# Patient Record
Sex: FEMALE | Race: BLACK OR AFRICAN AMERICAN
[De-identification: names, ages, dates, MRNs, and addresses within clinical notes are randomized per-mention and may not be internally consistent; named-entity substitution may affect disease eponyms.]

---

## 2014-08-05 VITALS
SYSTOLIC BLOOD PRESSURE: 154 MMHG | DIASTOLIC BLOOD PRESSURE: 76 MMHG | SYSTOLIC BLOOD PRESSURE: 154 MMHG | SYSTOLIC BLOOD PRESSURE: 154 MMHG | DIASTOLIC BLOOD PRESSURE: 76 MMHG | DIASTOLIC BLOOD PRESSURE: 76 MMHG | SYSTOLIC BLOOD PRESSURE: 154 MMHG | DIASTOLIC BLOOD PRESSURE: 76 MMHG

## 2020-09-25 ENCOUNTER — HOSPITAL ENCOUNTER (OUTPATIENT)
Dept: HOSPITAL 61 - KCIC MAMMO | Age: 85
Discharge: HOME | End: 2020-09-25
Attending: INTERNAL MEDICINE
Payer: MEDICARE

## 2020-09-25 DIAGNOSIS — Z12.31: Primary | ICD-10-CM

## 2020-09-25 PROCEDURE — 77067 SCR MAMMO BI INCL CAD: CPT

## 2020-09-25 PROCEDURE — 77063 BREAST TOMOSYNTHESIS BI: CPT

## 2020-09-25 NOTE — [facility]
Bilateral digital screening mammograms with 3-D tomosynthesis:

 

Reason for examination: Routine screening.

 

Comparison is made to previous studies dated 1/8/2015 and 8/2/2012.

 

Bilateral mammograms in CC and oblique projections were obtained with 2-D 

imaging and 3-D tomosynthesis imaging on a Siemens Inspiration unit and 

reviewed on the workstation. Interpretation was made with the benefit of 

CAD.

 

The skin and nipples show no abnormalities. No abnormal axillary lymph 

nodes are seen. The breast parenchyma shows scattered fatty and 

fibroglandular density. (Breast density: Category B.) There appears to be 

clustered microcalcifications in the 3:00 position posteriorly in the left

breast which may be associated with a small nodular density. Further 

evaluation with coned magnification views and left breast ultrasound is 

recommended. There are no other dominant masses, suspicious calcifications

or architectural distortion. Scattered benign-appearing calcifications are

present.

 

Impression:

 

Clustered calcifications at the 3:00 position posteriorly in the left 

breast which may be associated with a small nodule. Recommend further 

evaluation with coned magnification views and left breast ultrasound.

 

BI-RADS Category 0: Incomplete. Needs additional imaging evaluation.

 

"Our facility is accredited by the [employer] 

Mammography Program."

 

This patient's information has been entered into a reminder system for the

patient to be notified with the results of her examination and a target 

date for the next mammogram.

 

Electronically signed by: Clarisa Kidd MD (9/25/2020 2:27 PM) [id number]

## 2020-11-24 ENCOUNTER — HOSPITAL ENCOUNTER (OUTPATIENT)
Dept: HOSPITAL 61 - KCIC MAMMO | Age: 85
End: 2020-11-24
Attending: INTERNAL MEDICINE
Payer: MEDICARE

## 2020-11-24 DIAGNOSIS — N60.02: Primary | ICD-10-CM

## 2020-11-24 PROCEDURE — 77065 DX MAMMO INCL CAD UNI: CPT

## 2020-11-24 PROCEDURE — 76641 ULTRASOUND BREAST COMPLETE: CPT

## 2020-11-24 NOTE — [facility]
Left digital diagnostic mammogram and left breast ultrasound

 

Reason for examination: Workup of left outer breast nodule and 

microcalcifications

 

Comparison is made to previous study dated mammogram September 25, 2020 

and priors

 

Left CC and ML magnification digital views obtained. Interpretation was 

made with the benefit of CAD.

 

Left mammogram: The skin and nipples show no abnormalities. No abnormal 

lymph nodes are seen. The breast parenchyma is scattered fibroglandular 

elements. (Breast density: Category B.) at the left outer breast 3:00 

position posterior depth there is a subcentimeter moderately dense mass 

with indistinct margins associated with several rounded punctate clustered

microcalcifications with slight pleomorphism, the calcifications are new 

and the density and mass margins have become more distinct from prior 

studies.

 

ULTRASOUND: At the left breast 2:00 position 7 cm from the nipple there is

a 0.7 cm hypoechoic nonshadowing microlobular nonshadowing lesion which 

may be a cluster of cysts corresponding to the region of the mass and 

calcifications on mammography. While the sonographic features are not 

highly suspicious, the mammographic features are of greater suspicion and 

biopsy is advised. No axillary adenopathy.

 

Impression: Left outer breast suspicious microcalcifications associated 

with a hypoechoic complex cystic or solid mass. Left breast 

ultrasound-guided core needle biopsy of the mass and calcifications is 

advised. If after biopsy the sonographic and mammographic lesions are 

discovered to be discordant, or if no significant calcifications are 

yielded from the ultrasound-guided biopsy, subsequent stereotactic 

mammographic guided needle biopsy would be a consideration. Findings were 

discussed with the patient at time of exam. These findings were called to 

Dr. Vail's medical assistant Pelon at 10:50 AM November 24, 2020.

 

BI-RADS Category 4: Suspicious.

 

"Our facility is accredited by the [employer] 

Mammography Program."

 

This patient's information has been entered into a reminder system for the

patient to be notified with the results of her examination and a target 

date for the next mammogram.

 

Electronically signed by: Nitin James MD (11/24/2020 10:52 AM) 

[id number]

## 2020-12-18 ENCOUNTER — HOSPITAL ENCOUNTER (OUTPATIENT)
Dept: HOSPITAL 61 - US | Age: 85
Discharge: HOME | End: 2020-12-18
Attending: INTERNAL MEDICINE
Payer: MEDICARE

## 2020-12-18 DIAGNOSIS — R92.0: ICD-10-CM

## 2020-12-18 DIAGNOSIS — R92.8: Primary | ICD-10-CM

## 2020-12-18 DIAGNOSIS — Z88.0: ICD-10-CM

## 2020-12-18 DIAGNOSIS — Z88.8: ICD-10-CM

## 2020-12-18 DIAGNOSIS — N63.21: ICD-10-CM

## 2020-12-18 DIAGNOSIS — D05.12: ICD-10-CM

## 2020-12-18 DIAGNOSIS — Z88.1: ICD-10-CM

## 2020-12-18 PROCEDURE — 19081 BX BREAST 1ST LESION STRTCTC: CPT

## 2020-12-18 PROCEDURE — C1713 ANCHOR/SCREW BN/BN,TIS/BN: HCPCS

## 2020-12-18 PROCEDURE — 76942 ECHO GUIDE FOR BIOPSY: CPT

## 2020-12-18 PROCEDURE — 19083 BX BREAST 1ST LESION US IMAG: CPT

## 2020-12-18 PROCEDURE — 77065 DX MAMMO INCL CAD UNI: CPT

## 2020-12-18 NOTE — RAD
Examination: MG DIAGNOSTICUNILAT MAMMO, US GDE NDL BX/ASPIR/INJ/LOC



History: Reason: ABNORMAL LEFT MAMMOGRAM/POST BIOPSY CLIP PLACEMENT / Spl. Instructions:  / History: 




Comparison/Correlation: None



Findings: Risks, benefits, and alternatives regarding ultrasound-guided left breast biopsy of the 2:0
0 mass 7 cm from the nipple were discussed with the patient and informed consent was obtained. Cleans
ing with ChloraPrep at the anticipated site of needle placement was performed. Sterile draping, steri
le gel, and sterile probe cover is were utilized. Approximately 8 cc of 1 percent lidocaine was admin
istered subcutaneously and along the expected course of the needle tracks.



Lateral approach was utilized. Scalpel incision was made. Introducer for a 12-gauge needle was placed
. A total of 5 passes were made with a 12-gauge needle via the introducer. Samples were placed in a f
ormalin jar. Biopsy clip marker was then placed via the introducer.



Mediolateral and CC digital images of the left breast were obtained. Scattered fibroglandular densiti
es noted. Biopsy clip marker involving left upper-outer breast. Previously seen calcifications are si
gnificantly less in number. A biopsy clip marker may be approximately 0.5 center anterior to the site
 of the recently seen calcifications. No hematoma.



The patient tolerated the procedure well without immediate complications.





Impression:

Successful ultrasound guided core biopsy of the left breast 2:00 mass. Specimens sent to lab.



Electronically signed by: Bharat Rey MD (12/18/2020 3:35 PM) [email or web address]

## 2020-12-24 NOTE — PATHOLOGY
[facility] Accession Number: [id number]

.                                                                01

Material submitted:                                        .

breast - LEFT BREAST TISSUE, 2:00, 7CMFN. Modifiers: left, 2:00

.                                                                01

Clinical history:                                          .

LEFT BREAST MASS

.                                                                02

**********************************************************************

Diagnosis:

"Left breast tissue 2:00, 7 cmfn", needle biopsy:

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

MICROCALCIFICATIONS PRESENT.  (SEE COMMENT).

(CLW:neville; 12/22/2020)

[facility]  12/24/2020  2222 Local

**********************************************************************

.                                                                02

Comment:

Properly controlled immunohistochemical stains are performed.

.

Blocks A1 and A2:

CK5/6 - loss of staining in the ADH;

Smooth muscle myosin - intact myoepithelial cells;

P63 - intact myoepithelial cells.

.

The case is co-reviewed with Dr. Radha Frye and Dr. Melissa Valles.

Clinical and radiographic correlation is required.  The case will be

discussed with Dr. Khloe Vail and/or his office on 12/28/20.

.

(CLW:[place]; 12/22/2020)

.                                                                02

Electronically signed:                                     .

Val Jung MD, Pathologist

NPI- [id number]

.                                                                01

Gross description:                                         .

The specimen is received in formalin, labeled "Carine Peralta, left breast

2:00 7 cm from nipple".  Received are multiple needle cores of fibrofatty

tissue measuring 1.4 x 1.2 x 0.2 cm in aggregate dimensions.  The specimen

is submitted entirely in cassettes A1 through A3.  The cold ischemic time

is 1 minute.  The total formalin fixation time is 11 hours and 15 minutes.

([facility]; 12/18/2020)

[facility]  12/18/2020  1654 Local

.                                                                02

Pathologist provided ICD-10:

N60.82

.                                                                02

CPT                                                        .

325763, U07993, N06901

Specimen Comment: A courtesy copy of this report has been sent to [phone], [phone]-

Specimen Comment: [id number]

Specimen Comment: Report sent to  / DR VAIL

***Performed at:  01

   [facility]

   [address], [place]  [id number]

   MD Baldemar Tompkins MD Phone:  [phone]

***Performed at:  02

   [facility]

   [address], [place]  [id number]

   MD Jame Michele MD Phone:  [phone]

## 2021-02-11 ENCOUNTER — HOSPITAL ENCOUNTER (OUTPATIENT)
Dept: HOSPITAL 61 - LAB | Age: 86
End: 2021-02-11
Attending: SPECIALIST
Payer: MEDICARE

## 2021-02-11 DIAGNOSIS — Z20.822: ICD-10-CM

## 2021-02-11 DIAGNOSIS — Z01.812: Primary | ICD-10-CM

## 2021-02-11 PROCEDURE — U0003 INFECTIOUS AGENT DETECTION BY NUCLEIC ACID (DNA OR RNA); SEVERE ACUTE RESPIRATORY SYNDROME CORONAVIRUS 2 (SARS-COV-2) (CORONAVIRUS DISEASE [COVID-19]), AMPLIFIED PROBE TECHNIQUE, MAKING USE OF HIGH THROUGHPUT TECHNOLOGIES AS DESCRIBED BY CMS-2020-01-R: HCPCS

## 2021-02-23 NOTE — HP
ADMIT DATE:  



HISTORY OF PRESENT ILLNESS:  The patient was doing well without any known

disease until she had a routine mammogram, which showed a suspicious area of the

left breast.  This was biopsied and showed atypia with possible beginning of

carcinoma of the breast.  She therefore was sent to me.  The patient had no

other symptoms and is doing otherwise well.



PAST MEDICAL HISTORY:  Shows normal childhood diseases.



ALLERGIES:  She has no allergies.



MEDICATIONS:  She takes medication for hypertension only and also takes medicine

for diabetes, which was by mouth.  She does not take insulin and does take

medicine for elevated cholesterol.



PAST SURGICAL HISTORY:  Included an ectopic pregnancy and a hysterectomy. 

Otherwise, no other surgery.



REVIEW OF SYSTEMS:  Negative.  She had a needle biopsy of the left breast, which

showed the pathology noted above.



FAMILY HISTORY:  Positive for diabetes and hypertension.



PHYSICAL EXAMINATION:

GENERAL:  Shows an alert female.  She was in no acute distress.

HEAD, EYES, NOSE AND THROAT:  Grossly normal.

CHEST:  Bilaterally was clear to auscultation.

HEART:  Had no murmurs and no friction rubs.  She did have a rate of 70 beats

per minute and it was regular.

ABDOMEN AND EXTREMITIES:  Grossly negative as were the extremities.

BREASTS:  Examination of the right and left breast in the right and left axilla

were negative.  There were no masses, no skin retraction and there was no

evidence of carcinoma as there was no dimpling either.



IMPRESSION:  Hypertension, hypercholesterolemia, diabetes and left breast mass. 

Plan to remove the left breast mass and proceed.  It is noted that she did not

want to have anything done, but the mass removed.  We did discuss sentinel node

biopsies and other treatments, but she did not want that.  She only wanted at

this point a biopsy of the concerned area of the left breast with complete

removal of the mass if possible  We will do that and proceed accordingly

depending on the pathology.

 



______________________________

IVÁN RODRIGUEZ MD



DR:  ZINA/yenny  JOB#:  [id number] / [id number]

DD:  02/23/2021 22:14  DT:  02/23/2021 22:31

## 2021-02-24 ENCOUNTER — HOSPITAL ENCOUNTER (OUTPATIENT)
Dept: HOSPITAL 61 - US | Age: 86
Discharge: HOME | End: 2021-02-24
Attending: SPECIALIST
Payer: MEDICARE

## 2021-02-24 VITALS
DIASTOLIC BLOOD PRESSURE: 72 MMHG | DIASTOLIC BLOOD PRESSURE: 72 MMHG | SYSTOLIC BLOOD PRESSURE: 162 MMHG | SYSTOLIC BLOOD PRESSURE: 162 MMHG

## 2021-02-24 DIAGNOSIS — J45.909: ICD-10-CM

## 2021-02-24 DIAGNOSIS — Z90.710: ICD-10-CM

## 2021-02-24 DIAGNOSIS — M19.90: ICD-10-CM

## 2021-02-24 DIAGNOSIS — E78.00: ICD-10-CM

## 2021-02-24 DIAGNOSIS — Z98.890: ICD-10-CM

## 2021-02-24 DIAGNOSIS — Z79.84: ICD-10-CM

## 2021-02-24 DIAGNOSIS — N60.12: ICD-10-CM

## 2021-02-24 DIAGNOSIS — D05.12: Primary | ICD-10-CM

## 2021-02-24 DIAGNOSIS — I10: ICD-10-CM

## 2021-02-24 DIAGNOSIS — E11.9: ICD-10-CM

## 2021-02-24 DIAGNOSIS — Z88.0: ICD-10-CM

## 2021-02-24 DIAGNOSIS — Z88.8: ICD-10-CM

## 2021-02-24 DIAGNOSIS — N60.92: ICD-10-CM

## 2021-02-24 DIAGNOSIS — R92.8: ICD-10-CM

## 2021-02-24 LAB
ALBUMIN SERPL-MCNC: 3.7 G/DL (ref 3.4–5)
ALBUMIN/GLOB SERPL: 0.9 {RATIO} (ref 1–1.7)
ALP SERPL-CCNC: 57 U/L (ref 46–116)
ALT SERPL-CCNC: 22 U/L (ref 14–59)
ANION GAP SERPL CALC-SCNC: 6 MMOL/L (ref 6–14)
AST SERPL-CCNC: 16 U/L (ref 15–37)
BASOPHILS # BLD AUTO: 0 X10^3/UL (ref 0–0.2)
BASOPHILS NFR BLD: 0 % (ref 0–3)
BILIRUB SERPL-MCNC: 0.4 MG/DL (ref 0.2–1)
BUN SERPL-MCNC: 13 MG/DL (ref 7–20)
BUN/CREAT SERPL: 14 (ref 6–20)
CALCIUM SERPL-MCNC: 9.5 MG/DL (ref 8.5–10.1)
CHLORIDE SERPL-SCNC: 107 MMOL/L (ref 98–107)
CO2 SERPL-SCNC: 29 MMOL/L (ref 21–32)
CREAT SERPL-MCNC: 0.9 MG/DL (ref 0.6–1)
EOSINOPHIL NFR BLD: 0.1 X10^3/UL (ref 0–0.7)
EOSINOPHIL NFR BLD: 1 % (ref 0–3)
ERYTHROCYTE [DISTWIDTH] IN BLOOD BY AUTOMATED COUNT: 16.3 % (ref 11.5–14.5)
GFR SERPLBLD BASED ON 1.73 SQ M-ARVRAT: 71.5 ML/MIN
GLUCOSE SERPL-MCNC: 48 MG/DL (ref 70–99)
HCT VFR BLD CALC: 31.7 % (ref 36–47)
HGB BLD-MCNC: 10.2 G/DL (ref 12–15.5)
LYMPHOCYTES # BLD: 2 X10^3/UL (ref 1–4.8)
LYMPHOCYTES NFR BLD AUTO: 23 % (ref 24–48)
MCH RBC QN AUTO: 26 PG (ref 25–35)
MCHC RBC AUTO-ENTMCNC: 32 G/DL (ref 31–37)
MCV RBC AUTO: 80 FL (ref 79–100)
MONO #: 0.5 X10^3/UL (ref 0–1.1)
MONOCYTES NFR BLD: 6 % (ref 0–9)
NEUT #: 5.9 X10^3/UL (ref 1.8–7.7)
NEUTROPHILS NFR BLD AUTO: 70 % (ref 31–73)
PLATELET # BLD AUTO: 197 X10^3/UL (ref 140–400)
POTASSIUM SERPL-SCNC: 4.2 MMOL/L (ref 3.5–5.1)
PROT SERPL-MCNC: 7.6 G/DL (ref 6.4–8.2)
RBC # BLD AUTO: 3.95 X10^6/UL (ref 3.5–5.4)
SODIUM SERPL-SCNC: 142 MMOL/L (ref 136–145)
WBC # BLD AUTO: 8.4 X10^3/UL (ref 4–11)

## 2021-02-24 PROCEDURE — 88305 TISSUE EXAM BY PATHOLOGIST: CPT

## 2021-02-24 PROCEDURE — 85025 COMPLETE CBC W/AUTO DIFF WBC: CPT

## 2021-02-24 PROCEDURE — 82962 GLUCOSE BLOOD TEST: CPT

## 2021-02-24 PROCEDURE — 36415 COLL VENOUS BLD VENIPUNCTURE: CPT

## 2021-02-24 PROCEDURE — 76098 X-RAY EXAM SURGICAL SPECIMEN: CPT

## 2021-02-24 PROCEDURE — 19125 EXCISION BREAST LESION: CPT

## 2021-02-24 PROCEDURE — 80053 COMPREHEN METABOLIC PANEL: CPT

## 2021-02-24 PROCEDURE — 19281 PERQ DEVICE BREAST 1ST IMAG: CPT

## 2021-02-24 NOTE — RAD
******** ADDENDUM #1 ********

A tissue specimen was subsequently radiographed and the specimen x-ray showed the biopsy marker withi
n the specimen at the J2 coordinates in the specimen, as well as the hook wire and residual density a
ssociated with the biopsied mass. Findings discussed with Dr. Stevens by telephone at 3:05 PM on 2/24/
2021.



Electronically signed by: Cristopher Jc MD (2/25/2021 6:22 PM) [id number]

******** ORIGINAL REPORT ********

Examination: Mammographically guided left breast needle localization.



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
y of a mass with associated calcifications in the left breast is referred by her surgeon for preopera
tive needle localization.



COMPARISON: Ultrasound guided left breast core needle biopsy of 12/18/2020, and post procedure mammog
kaylah that same day.



TECHNIQUE AND FINDINGS:



Informed consent was obtained and an appropriate procedural pause observed. Using standard sterile te
chnique, mammographic imaging guidance and local anesthesia, a 7.5 cm Red needle was advanced fro
m a craniocaudal approach into the lateral left breast targeting the S-shaped biopsy marker and the r
esidual mass.



After confirmation of satisfactory needle tip positioning, a 0.4 mL of methylene blue were injected a
t the biopsy site for intraoperative guidance after which the hook wire was threaded through the need
le, maintained in position and the needle removed with the wire left in place.



Final postprocedural images showed satisfactory positioning of the hook wire adjacent to the biopsy m
arker and residual mass. No apparent complications. Imaging findings reviewed with the patient's refe
rring surgeon at the conclusion of the procedure.



:

Successful mammographically guided left breast needle localization for planned later same day excisio
nal biopsy. A specimen radiograph is recommended. It should contains the biopsy marker, hook wire, an
d residual mass.



Electronically signed by: Cristopher Jc MD (2/24/2021 6:23 PM) [id number]

## 2021-02-24 NOTE — PDOC
SURGICAL PROGRESS NOTE


DATE: 2/24/21 


TIME: 13:27





Op Note:





surgeon........................................Rodney


Pre and post op diag.......................mass left breast


Anesthesia...................................general


Procedure....................................excisionmass left breast via needle

localizatio


drains..........................................none


Fluids..........................................see anesthesia sheet


Blood loss....................................7cc


condition......................................satisfactory


Vital Signs





Vital Signs








  Date Time  Temp Pulse Resp B/P (MAP) Pulse Ox O2 Delivery O2 Flow Rate FiO2


 


2/24/21 12:01 97.8 68 20 189/81 99 Room Air  





 97.8       








Labs





Laboratory Tests








Test


 2/24/21


12:09 2/24/21


12:35 2/24/21


12:45 2/24/21


13:09


 


Glucose (Fingerstick)


 41 mg/dL


(70-99) 


 39 mg/dL


(70-99) 86 mg/dL


(70-99)


 


White Blood Count


 


 8.4 x10^3/uL


(4.0-11.0) 


 





 


Red Blood Count


 


 3.95 x10^6/uL


(3.50-5.40) 


 





 


Hemoglobin


 


 10.2 g/dL


(12.0-15.5) 


 





 


Hematocrit


 


 31.7 %


(36.0-47.0) 


 





 


Mean Corpuscular Volume  80 fL ()   


 


Mean Corpuscular Hemoglobin  26 pg (25-35)   


 


Mean Corpuscular Hemoglobin


Concent 


 32 g/dL


(31-37) 


 





 


Red Cell Distribution Width


 


 16.3 %


(11.5-14.5) 


 





 


Platelet Count


 


 197 x10^3/uL


(140-400) 


 





 


Neutrophils (%) (Auto)  70 % (31-73)   


 


Lymphocytes (%) (Auto)  23 % (24-48)   


 


Monocytes (%) (Auto)  6 % (0-9)   


 


Eosinophils (%) (Auto)  1 % (0-3)   


 


Basophils (%) (Auto)  0 % (0-3)   


 


Neutrophils # (Auto)


 


 5.9 x10^3/uL


(1.8-7.7) 


 





 


Lymphocytes # (Auto)


 


 2.0 x10^3/uL


(1.0-4.8) 


 





 


Monocytes # (Auto)


 


 0.5 x10^3/uL


(0.0-1.1) 


 





 


Eosinophils # (Auto)


 


 0.1 x10^3/uL


(0.0-0.7) 


 





 


Basophils # (Auto)


 


 0.0 x10^3/uL


(0.0-0.2) 


 





 


Sodium Level


 


 142 mmol/L


(136-145) 


 





 


Potassium Level


 


 4.2 mmol/L


(3.5-5.1) 


 





 


Chloride Level


 


 107 mmol/L


() 


 





 


Carbon Dioxide Level


 


 29 mmol/L


(21-32) 


 





 


Anion Gap  6 (6-14)   


 


Blood Urea Nitrogen


 


 13 mg/dL


(7-20) 


 





 


Creatinine


 


 0.9 mg/dL


(0.6-1.0) 


 





 


Estimated GFR


(Cockcroft-Gault) 


 71.5 


 


 





 


BUN/Creatinine Ratio  14 (6-20)   


 


Glucose Level


 


 48 mg/dL


(70-99) 


 





 


Calcium Level


 


 9.5 mg/dL


(8.5-10.1) 


 





 


Total Bilirubin


 


 0.4 mg/dL


(0.2-1.0) 


 





 


Aspartate Amino Transf


(AST/SGOT) 


 16 U/L (15-37) 


 


 





 


Alanine Aminotransferase


(ALT/SGPT) 


 22 U/L (14-59) 


 


 





 


Alkaline Phosphatase


 


 57 U/L


() 


 





 


Total Protein


 


 7.6 g/dL


(6.4-8.2) 


 





 


Albumin


 


 3.7 g/dL


(3.4-5.0) 


 





 


Albumin/Globulin Ratio  0.9 (1.0-1.7)   








Laboratory Tests








Test


 2/24/21


12:09 2/24/21


12:35 2/24/21


12:45 2/24/21


13:09


 


Glucose (Fingerstick)


 41 mg/dL


(70-99) 


 39 mg/dL


(70-99) 86 mg/dL


(70-99)


 


White Blood Count


 


 8.4 x10^3/uL


(4.0-11.0) 


 





 


Red Blood Count


 


 3.95 x10^6/uL


(3.50-5.40) 


 





 


Hemoglobin


 


 10.2 g/dL


(12.0-15.5) 


 





 


Hematocrit


 


 31.7 %


(36.0-47.0) 


 





 


Mean Corpuscular Volume  80 fL ()   


 


Mean Corpuscular Hemoglobin  26 pg (25-35)   


 


Mean Corpuscular Hemoglobin


Concent 


 32 g/dL


(31-37) 


 





 


Red Cell Distribution Width


 


 16.3 %


(11.5-14.5) 


 





 


Platelet Count


 


 197 x10^3/uL


(140-400) 


 





 


Neutrophils (%) (Auto)  70 % (31-73)   


 


Lymphocytes (%) (Auto)  23 % (24-48)   


 


Monocytes (%) (Auto)  6 % (0-9)   


 


Eosinophils (%) (Auto)  1 % (0-3)   


 


Basophils (%) (Auto)  0 % (0-3)   


 


Neutrophils # (Auto)


 


 5.9 x10^3/uL


(1.8-7.7) 


 





 


Lymphocytes # (Auto)


 


 2.0 x10^3/uL


(1.0-4.8) 


 





 


Monocytes # (Auto)


 


 0.5 x10^3/uL


(0.0-1.1) 


 





 


Eosinophils # (Auto)


 


 0.1 x10^3/uL


(0.0-0.7) 


 





 


Basophils # (Auto)


 


 0.0 x10^3/uL


(0.0-0.2) 


 





 


Sodium Level


 


 142 mmol/L


(136-145) 


 





 


Potassium Level


 


 4.2 mmol/L


(3.5-5.1) 


 





 


Chloride Level


 


 107 mmol/L


() 


 





 


Carbon Dioxide Level


 


 29 mmol/L


(21-32) 


 





 


Anion Gap  6 (6-14)   


 


Blood Urea Nitrogen


 


 13 mg/dL


(7-20) 


 





 


Creatinine


 


 0.9 mg/dL


(0.6-1.0) 


 





 


Estimated GFR


(Cockcroft-Gault) 


 71.5 


 


 





 


BUN/Creatinine Ratio  14 (6-20)   


 


Glucose Level


 


 48 mg/dL


(70-99) 


 





 


Calcium Level


 


 9.5 mg/dL


(8.5-10.1) 


 





 


Total Bilirubin


 


 0.4 mg/dL


(0.2-1.0) 


 





 


Aspartate Amino Transf


(AST/SGOT) 


 16 U/L (15-37) 


 


 





 


Alanine Aminotransferase


(ALT/SGPT) 


 22 U/L (14-59) 


 


 





 


Alkaline Phosphatase


 


 57 U/L


() 


 





 


Total Protein


 


 7.6 g/dL


(6.4-8.2) 


 





 


Albumin


 


 3.7 g/dL


(3.4-5.0) 


 





 


Albumin/Globulin Ratio  0.9 (1.0-1.7)   











Justicifation of Admission Dx:


Justifications for Admission:


Justification of Admission Dx:  Yes











IVÁN RODRIGUEZ MD               Feb 24, 2021 13:32

## 2021-02-24 NOTE — OP
DATE OF SURGERY:  02/24/2021



SURGEON:  Gabriel Rodriguez MD



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



ANESTHESIA:  General.



PROCEDURE:  Excision of mass, left breast.



TECHNIQUE:  Under general anesthesia, the patient was properly prepped and

draped in a routine fashion.  She had a guidewire put in and the mass was small

and not palpable.  The guidewire was in the upper outer quadrant and we made an

incision somewhat inferior to this and so that if we had to do more surgery

later, we could do it without going through a previous incision.  As such, the

incision was made about an inch below the guidewire and following the skin lines

in the upper outer quadrant of the left breast, we did this with a 15 blade

about an inch and half to 2 inches in size.  We got into the subcutaneous and

then used Lupe retractors to elevate it superiorly and medially in the

subcutaneous over to the guidewire.  The mass was about 7 cm beneath the skin

and as such, we went over to the guidewire and then divided the tissue around it

as we grasped the guidewire and the tissue with a clamp.  We then went around

the area and around the guidewire and took it downgoing wide so that we would

not enter the mass.  We got down pretty deep and we could actually feel the

mass.  We also saw the methylene blue that had been put there.  We went well

around the mass so that we got grossly at least well around it and again did not

enter the mass that we could tail.  We slowly excised it and the methylene blue

where the mass was.  Had a generous piece of breast tissue and we did this, so

that should it be malignant at her age, it may avoid further surgery we will

have to see.  At any rate, we had one small bleeder, which had to be cauterized

and then sent this specimen to the x-ray department and the radiologist did say

that the methylene blue, the clip that had previously been placed at the biopsy

site, the tumor and mass were all in the specimen and that no further surgery

was necessary at this time.  Certainly not to remove the mass as it had been

removed.  The resultant defect was then irrigated with saline and then

approximated using 3-0 Vicryl interrupted sutures, deep in the breast tissue.  A

4-0 was used more for the superficial subcutaneous tissues and deep dermis.  The

skin was closed using subcuticular 5-0 Vicryl, which normally do not use, but in

this case with the coronavirus and the difficulty, the patient had to get into

the hospital and being seen.  We thought this would be the better technique as

they would not require suture removal and therefore doctor visit would not be

urgent.  This having been done, the procedure was terminated.  The sterile

dressing was then applied, making certain not to dress any tape on the nipple

and the procedure was terminated.  The blood loss was probably 7 or 8 mL. 

Fluids given can be obtained from the anesthesia sheet.  No drains were used. 

Condition of the patient was satisfactory as she has returned to the recovery

room.

 



______________________________

GABRIEL RODRIGUEZ MD



DR:  ZINA/yenny  JOB#:  [id number] / [id number]

DD:  02/24/2021 18:46  DT:  02/24/2021 19:24

## 2021-02-24 NOTE — DISCH
DISCHARGE INSTRUCTIONS


Condition on Discharge


Condition on Discharge:  Stable





Activity After Discharge


Activity Instructions for Disc:  No restrictions





Diet after Discharge


Additional Diet Restrictions:  diet as pre op





Wound Incision Care


Other wound/incision instructi:  keep wound and dressing clean and dry





Follow-Up


Follow up with:  call and make appointment to see Dr. Rodriguez in 14 days.











IVÁN RODRIGUEZ MD               Feb 24, 2021 15:45

## 2021-02-24 NOTE — PDOC
SURGICAL PROGRESS NOTE


DATE: 2/24/21 


TIME: 13:26


no change in dictated H&P/


Vital Signs





Vital Signs








  Date Time  Temp Pulse Resp B/P (MAP) Pulse Ox O2 Delivery O2 Flow Rate FiO2


 


2/24/21 12:01 97.8 68 20 189/81 99 Room Air  





 97.8       








Labs





Laboratory Tests








Test


 2/24/21


12:09 2/24/21


12:35 2/24/21


12:45 2/24/21


13:09


 


Glucose (Fingerstick)


 41 mg/dL


(70-99) 


 39 mg/dL


(70-99) 86 mg/dL


(70-99)


 


White Blood Count


 


 8.4 x10^3/uL


(4.0-11.0) 


 





 


Red Blood Count


 


 3.95 x10^6/uL


(3.50-5.40) 


 





 


Hemoglobin


 


 10.2 g/dL


(12.0-15.5) 


 





 


Hematocrit


 


 31.7 %


(36.0-47.0) 


 





 


Mean Corpuscular Volume  80 fL ()   


 


Mean Corpuscular Hemoglobin  26 pg (25-35)   


 


Mean Corpuscular Hemoglobin


Concent 


 32 g/dL


(31-37) 


 





 


Red Cell Distribution Width


 


 16.3 %


(11.5-14.5) 


 





 


Platelet Count


 


 197 x10^3/uL


(140-400) 


 





 


Neutrophils (%) (Auto)  70 % (31-73)   


 


Lymphocytes (%) (Auto)  23 % (24-48)   


 


Monocytes (%) (Auto)  6 % (0-9)   


 


Eosinophils (%) (Auto)  1 % (0-3)   


 


Basophils (%) (Auto)  0 % (0-3)   


 


Neutrophils # (Auto)


 


 5.9 x10^3/uL


(1.8-7.7) 


 





 


Lymphocytes # (Auto)


 


 2.0 x10^3/uL


(1.0-4.8) 


 





 


Monocytes # (Auto)


 


 0.5 x10^3/uL


(0.0-1.1) 


 





 


Eosinophils # (Auto)


 


 0.1 x10^3/uL


(0.0-0.7) 


 





 


Basophils # (Auto)


 


 0.0 x10^3/uL


(0.0-0.2) 


 





 


Sodium Level


 


 142 mmol/L


(136-145) 


 





 


Potassium Level


 


 4.2 mmol/L


(3.5-5.1) 


 





 


Chloride Level


 


 107 mmol/L


() 


 





 


Carbon Dioxide Level


 


 29 mmol/L


(21-32) 


 





 


Anion Gap  6 (6-14)   


 


Blood Urea Nitrogen


 


 13 mg/dL


(7-20) 


 





 


Creatinine


 


 0.9 mg/dL


(0.6-1.0) 


 





 


Estimated GFR


(Cockcroft-Gault) 


 71.5 


 


 





 


BUN/Creatinine Ratio  14 (6-20)   


 


Glucose Level


 


 48 mg/dL


(70-99) 


 





 


Calcium Level


 


 9.5 mg/dL


(8.5-10.1) 


 





 


Total Bilirubin


 


 0.4 mg/dL


(0.2-1.0) 


 





 


Aspartate Amino Transf


(AST/SGOT) 


 16 U/L (15-37) 


 


 





 


Alanine Aminotransferase


(ALT/SGPT) 


 22 U/L (14-59) 


 


 





 


Alkaline Phosphatase


 


 57 U/L


() 


 





 


Total Protein


 


 7.6 g/dL


(6.4-8.2) 


 





 


Albumin


 


 3.7 g/dL


(3.4-5.0) 


 





 


Albumin/Globulin Ratio  0.9 (1.0-1.7)   








Laboratory Tests








Test


 2/24/21


12:09 2/24/21


12:35 2/24/21


12:45 2/24/21


13:09


 


Glucose (Fingerstick)


 41 mg/dL


(70-99) 


 39 mg/dL


(70-99) 86 mg/dL


(70-99)


 


White Blood Count


 


 8.4 x10^3/uL


(4.0-11.0) 


 





 


Red Blood Count


 


 3.95 x10^6/uL


(3.50-5.40) 


 





 


Hemoglobin


 


 10.2 g/dL


(12.0-15.5) 


 





 


Hematocrit


 


 31.7 %


(36.0-47.0) 


 





 


Mean Corpuscular Volume  80 fL ()   


 


Mean Corpuscular Hemoglobin  26 pg (25-35)   


 


Mean Corpuscular Hemoglobin


Concent 


 32 g/dL


(31-37) 


 





 


Red Cell Distribution Width


 


 16.3 %


(11.5-14.5) 


 





 


Platelet Count


 


 197 x10^3/uL


(140-400) 


 





 


Neutrophils (%) (Auto)  70 % (31-73)   


 


Lymphocytes (%) (Auto)  23 % (24-48)   


 


Monocytes (%) (Auto)  6 % (0-9)   


 


Eosinophils (%) (Auto)  1 % (0-3)   


 


Basophils (%) (Auto)  0 % (0-3)   


 


Neutrophils # (Auto)


 


 5.9 x10^3/uL


(1.8-7.7) 


 





 


Lymphocytes # (Auto)


 


 2.0 x10^3/uL


(1.0-4.8) 


 





 


Monocytes # (Auto)


 


 0.5 x10^3/uL


(0.0-1.1) 


 





 


Eosinophils # (Auto)


 


 0.1 x10^3/uL


(0.0-0.7) 


 





 


Basophils # (Auto)


 


 0.0 x10^3/uL


(0.0-0.2) 


 





 


Sodium Level


 


 142 mmol/L


(136-145) 


 





 


Potassium Level


 


 4.2 mmol/L


(3.5-5.1) 


 





 


Chloride Level


 


 107 mmol/L


() 


 





 


Carbon Dioxide Level


 


 29 mmol/L


(21-32) 


 





 


Anion Gap  6 (6-14)   


 


Blood Urea Nitrogen


 


 13 mg/dL


(7-20) 


 





 


Creatinine


 


 0.9 mg/dL


(0.6-1.0) 


 





 


Estimated GFR


(Cockcroft-Gault) 


 71.5 


 


 





 


BUN/Creatinine Ratio  14 (6-20)   


 


Glucose Level


 


 48 mg/dL


(70-99) 


 





 


Calcium Level


 


 9.5 mg/dL


(8.5-10.1) 


 





 


Total Bilirubin


 


 0.4 mg/dL


(0.2-1.0) 


 





 


Aspartate Amino Transf


(AST/SGOT) 


 16 U/L (15-37) 


 


 





 


Alanine Aminotransferase


(ALT/SGPT) 


 22 U/L (14-59) 


 


 





 


Alkaline Phosphatase


 


 57 U/L


() 


 





 


Total Protein


 


 7.6 g/dL


(6.4-8.2) 


 





 


Albumin


 


 3.7 g/dL


(3.4-5.0) 


 





 


Albumin/Globulin Ratio  0.9 (1.0-1.7)   











Justicifation of Admission Dx:


Justifications for Admission:


Justification of Admission Dx:  Yes











IVÁN RODRIGUEZ MD               Feb 24, 2021 13:27

## 2021-02-24 NOTE — RAD
******** ADDENDUM #1 ********

A tissue specimen was subsequently radiographed and the specimen x-ray showed the biopsy marker withi
n the specimen at the J2 coordinates in the specimen, as well as the hook wire and residual density a
ssociated with the biopsied mass. Findings discussed with Dr. Stevens by telephone at 3:05 PM on 2/24/
2021.



Electronically signed by: Cristopher Jc MD (2/25/2021 6:22 PM) [id number]

******** ORIGINAL REPORT ********

Examination: Mammographically guided left breast needle localization.



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
y of a mass with associated calcifications in the left breast is referred by her surgeon for preopera
tive needle localization.



COMPARISON: Ultrasound guided left breast core needle biopsy of 12/18/2020, and post procedure mammog
[place] that same day.



TECHNIQUE AND FINDINGS:



Informed consent was obtained and an appropriate procedural pause observed. Using standard sterile te
chnique, mammographic imaging guidance and local anesthesia, a 7.5 cm Red needle was advanced fro
m a craniocaudal approach into the lateral left breast targeting the S-shaped biopsy marker and the r
esidual mass.



After confirmation of satisfactory needle tip positioning, a 0.4 mL of methylene blue were injected a
t the biopsy site for intraoperative guidance after which the hook wire was threaded through the need
le, maintained in position and the needle removed with the wire left in place.



Final postprocedural images showed satisfactory positioning of the hook wire adjacent to the biopsy m
arker and residual mass. No apparent complications. Imaging findings reviewed with the patient's refe
rring surgeon at the conclusion of the procedure.



:

Successful mammographically guided left breast needle localization for planned later same day excisio
nal biopsy. A specimen radiograph is recommended. It should contains the biopsy marker, hook wire, an
d residual mass.



Electronically signed by: Cristopher Jc MD (2/24/2021 6:23 PM) [id number]

## 2021-10-13 ENCOUNTER — HOSPITAL ENCOUNTER (OUTPATIENT)
Dept: HOSPITAL 61 - KCIC | Age: 86
End: 2021-10-13
Attending: INTERNAL MEDICINE
Payer: MEDICARE

## 2021-10-13 DIAGNOSIS — M17.11: Primary | ICD-10-CM

## 2021-10-13 PROCEDURE — 73562 X-RAY EXAM OF KNEE 3: CPT

## 2021-10-13 NOTE — KCIC
Examination: 3 views of the right knee



HISTORY: History of right knee pain, injury



COMPARISON: None available



FINDINGS:



Severe joint space loss medial, lateral, patellofemoral compartments with large osteophyte formation 
identified in the medial, lateral, patellofemoral compartments. Small knee joint effusion.



IMPRESSION:



Severe tricompartmental degenerative changes, most severe in the lateral, patellofemoral compartments
..



Electronically signed by: Mino Rodriguez MD (10/13/2021 4:36 PM) [id number]

## 2022-01-09 ENCOUNTER — HOSPITAL ENCOUNTER (OUTPATIENT)
Dept: HOSPITAL 61 - ER | Age: 87
Setting detail: OBSERVATION
LOS: 1 days | Discharge: HOME | End: 2022-01-10
Attending: INTERNAL MEDICINE | Admitting: INTERNAL MEDICINE
Payer: MEDICARE

## 2022-01-09 VITALS — SYSTOLIC BLOOD PRESSURE: 128 MMHG | DIASTOLIC BLOOD PRESSURE: 53 MMHG

## 2022-01-09 VITALS — DIASTOLIC BLOOD PRESSURE: 64 MMHG | SYSTOLIC BLOOD PRESSURE: 184 MMHG

## 2022-01-09 VITALS — HEIGHT: 63 IN | WEIGHT: 150.13 LBS | BODY MASS INDEX: 26.6 KG/M2

## 2022-01-09 DIAGNOSIS — E03.9: ICD-10-CM

## 2022-01-09 DIAGNOSIS — E78.5: ICD-10-CM

## 2022-01-09 DIAGNOSIS — I10: ICD-10-CM

## 2022-01-09 DIAGNOSIS — D64.9: ICD-10-CM

## 2022-01-09 DIAGNOSIS — E11.649: Primary | ICD-10-CM

## 2022-01-09 DIAGNOSIS — K74.60: ICD-10-CM

## 2022-01-09 DIAGNOSIS — J45.909: ICD-10-CM

## 2022-01-09 DIAGNOSIS — F32.9: ICD-10-CM
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
## 2022-01-09 DIAGNOSIS — I70.0: ICD-10-CM

## 2022-01-09 DIAGNOSIS — R29.6: ICD-10-CM

## 2022-01-09 DIAGNOSIS — Z90.49: ICD-10-CM
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
## 2022-01-09 DIAGNOSIS — R42: ICD-10-CM
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
## 2022-01-09 DIAGNOSIS — Z90.710: ICD-10-CM

## 2022-01-09 DIAGNOSIS — S30.0XXA: ICD-10-CM

## 2022-01-09 DIAGNOSIS — Z79.899: ICD-10-CM

## 2022-01-09 DIAGNOSIS — Z98.890: ICD-10-CM

## 2022-01-09 DIAGNOSIS — G93.41: ICD-10-CM

## 2022-01-09 LAB
ALBUMIN SERPL-MCNC: 3.4 G/DL (ref 3.4–5)
ALBUMIN/GLOB SERPL: 0.8 {RATIO} (ref 1–1.7)
ALP SERPL-CCNC: 70 U/L (ref 46–116)
ALT SERPL-CCNC: 20 U/L (ref 14–59)
ANION GAP SERPL CALC-SCNC: 11 MMOL/L (ref 6–14)
APTT PPP: YELLOW S
AST SERPL-CCNC: 22 U/L (ref 15–37)
BACTERIA #/AREA URNS HPF: 0 /HPF
BASOPHILS # BLD AUTO: 0 X10^3/UL (ref 0–0.2)
BASOPHILS NFR BLD: 0 % (ref 0–3)
BILIRUB SERPL-MCNC: 0.2 MG/DL (ref 0.2–1)
BILIRUB UR QL STRIP: NEGATIVE
BUN SERPL-MCNC: 12 MG/DL (ref 7–20)
BUN/CREAT SERPL: 15 (ref 6–20)
CALCIUM SERPL-MCNC: 9.4 MG/DL (ref 8.5–10.1)
CHLORIDE SERPL-SCNC: 105 MMOL/L (ref 98–107)
CK SERPL-CCNC: 247 U/L (ref 26–192)
CO2 SERPL-SCNC: 23 MMOL/L (ref 21–32)
CREAT SERPL-MCNC: 0.8 MG/DL (ref 0.6–1)
EOSINOPHIL NFR BLD: 0 % (ref 0–3)
EOSINOPHIL NFR BLD: 0 X10^3/UL (ref 0–0.7)
ERYTHROCYTE [DISTWIDTH] IN BLOOD BY AUTOMATED COUNT: 16.3 % (ref 11.5–14.5)
FIBRINOGEN PPP-MCNC: CLEAR MG/DL
GFR SERPLBLD BASED ON 1.73 SQ M-ARVRAT: 81.7 ML/MIN
GLUCOSE SERPL-MCNC: 167 MG/DL (ref 70–99)
HCT VFR BLD CALC: 31.6 % (ref 36–47)
HGB BLD-MCNC: 10.2 G/DL (ref 12–15.5)
LYMPHOCYTES # BLD: 1.3 X10^3/UL (ref 1–4.8)
LYMPHOCYTES NFR BLD AUTO: 16 % (ref 24–48)
MAGNESIUM SERPL-MCNC: 1.8 MG/DL (ref 1.8–2.4)
MCH RBC QN AUTO: 26 PG (ref 25–35)
MCHC RBC AUTO-ENTMCNC: 32 G/DL (ref 31–37)
MCV RBC AUTO: 80 FL (ref 79–100)
MONO #: 0.4 X10^3/UL (ref 0–1.1)
MONOCYTES NFR BLD: 5 % (ref 0–9)
NEUT #: 6.4 X10^3/UL (ref 1.8–7.7)
NEUTROPHILS NFR BLD AUTO: 79 % (ref 31–73)
NITRITE UR QL STRIP: NEGATIVE
PH UR STRIP: 6.5 [PH]
PHOSPHATE SERPL-MCNC: 2.8 MG/DL (ref 2.6–4.7)
PLATELET # BLD AUTO: 244 X10^3/UL (ref 140–400)
POTASSIUM SERPL-SCNC: 4.2 MMOL/L (ref 3.5–5.1)
PROT SERPL-MCNC: 7.5 G/DL (ref 6.4–8.2)
PROT UR STRIP-MCNC: 30 MG/DL
RBC # BLD AUTO: 3.96 X10^6/UL (ref 3.5–5.4)
RBC #/AREA URNS HPF: (no result) /HPF (ref 0–2)
SODIUM SERPL-SCNC: 139 MMOL/L (ref 136–145)
UROBILINOGEN UR-MCNC: 0.2 MG/DL
WBC # BLD AUTO: 8.1 X10^3/UL (ref 4–11)
WBC #/AREA URNS HPF: (no result) /HPF (ref 0–4)

## 2022-01-09 PROCEDURE — 84100 ASSAY OF PHOSPHORUS: CPT

## 2022-01-09 PROCEDURE — 71045 X-RAY EXAM CHEST 1 VIEW: CPT

## 2022-01-09 PROCEDURE — 99285 EMERGENCY DEPT VISIT HI MDM: CPT

## 2022-01-09 PROCEDURE — 83605 ASSAY OF LACTIC ACID: CPT

## 2022-01-09 PROCEDURE — 81001 URINALYSIS AUTO W/SCOPE: CPT

## 2022-01-09 PROCEDURE — 97165 OT EVAL LOW COMPLEX 30 MIN: CPT

## 2022-01-09 PROCEDURE — G0379 DIRECT REFER HOSPITAL OBSERV: HCPCS

## 2022-01-09 PROCEDURE — 70450 CT HEAD/BRAIN W/O DYE: CPT

## 2022-01-09 PROCEDURE — 97116 GAIT TRAINING THERAPY: CPT

## 2022-01-09 PROCEDURE — 93005 ELECTROCARDIOGRAM TRACING: CPT

## 2022-01-09 PROCEDURE — 97162 PT EVAL MOD COMPLEX 30 MIN: CPT

## 2022-01-09 PROCEDURE — 83735 ASSAY OF MAGNESIUM: CPT

## 2022-01-09 PROCEDURE — 36415 COLL VENOUS BLD VENIPUNCTURE: CPT

## 2022-01-09 PROCEDURE — 87040 BLOOD CULTURE FOR BACTERIA: CPT

## 2022-01-09 PROCEDURE — 80053 COMPREHEN METABOLIC PANEL: CPT

## 2022-01-09 PROCEDURE — 85025 COMPLETE CBC W/AUTO DIFF WBC: CPT

## 2022-01-09 PROCEDURE — 82962 GLUCOSE BLOOD TEST: CPT

## 2022-01-09 PROCEDURE — 82550 ASSAY OF CK (CPK): CPT

## 2022-01-09 PROCEDURE — 84484 ASSAY OF TROPONIN QUANT: CPT

## 2022-01-09 PROCEDURE — G0378 HOSPITAL OBSERVATION PER HR: HCPCS

## 2022-01-09 NOTE — RAD
CT HEAD



INDICATION: Fall, altered mental status



COMPARISON: None Available.



Exposure: One or more of the following individualized dose reduction techniques were utilized for thi
s examination:  1. Automated exposure control  2. Adjustment of the mA and/or kV according to patient
 size  3. Use of iterative reconstruction technique



TECHNIQUE: 5 mm contiguous axial images were obtained from the skull base to the vertex in both bone 
and soft tissue algorithm.  



FINDINGS: 

No abnormal attenuation within the brain parenchyma. Mild contusion left parietal scalp region.



No evidence of acute intracranial hemorrhage.   No extra-axial fluid collections.



No mass effect or midline shift. Ventricular size is appropriate.  Basal cisterns are patent.



No fractures identified.Gray-white differentiation is preserved.Globes and orbits are within normal l
imits.   Paranasal sinuses and mastoid air cells are clear.   



IMPRESSION:

1.  No acute intracranial findings.

2.  Mild contusion left parietal scalp region.



Electronically signed by: Mino Rodriguez MD (1/9/2022 12:04 PM) [id number]

## 2022-01-09 NOTE — PHYS DOC
Past Medical History


Past Medical History:  Diabetes-Type II, Hypertension


Past Surgical History:  Hysterectomy


Past Surgical History


Mastectomy


Ectopic pregnancy surgery





General Adult


HPI:


HPI:





Patient is a 89  year old female who presents from home via EMS for altered 

mental status, generalized weakness, multiple falls out of bed.  EMS noted a 

blood sugar of 60 in route, she is 30 here.  The patient is drowsy, but she is 

awake.  She follows commands.  She has no specific complaints other than feeling

weak.  Previous records indicate a history of type 2 diabetes mellitus with oral

hypoglycemic agents only.  No reported history of taking insulin.  The patient 

admits to not feeling very well for the last few days, she has been taking her 

oral hypoglycemic medication but she has not been eating very much.  She was 

profoundly hypoglycemic on arrival, and after being fed and given oral glucose 

solution, her blood sugar is markedly proved, and she reports feeling much 

better she has no physical complaints of any pain or discomfort.  She denies 

headache, dizziness, nausea vomiting, abdominal pain, chest pain, dyspnea.  She 

reports that she has been lightheaded and dizzy and has fallen out of bed a few 

times.  She denies any loss of consciousness.  She reports no further dizziness 

or generalized weakness any longer now that her blood sugar is normal is any 

neck pain or back pain.  She denies headache.





Review of Systems:


Review of Systems:


Constitutional:   Denies fever or chills.  Generalized malaise and fatigue


Eyes:   Denies change in visual acuity.  No vision loss.


HENT:   Denies nasal congestion or sore throat. [] 


Respiratory:   Denies cough or shortness of breath. [] 


Cardiovascular:   Denies chest pain or edema. [] 


GI:   Denies abdominal pain, nausea, vomiting, or diarrhea


:  Denies urinary symptom


Musculoskeletal:   Denies back pain or joint pain. [] 


Integument:   Denies rash. [] 


Neurologic:   Denies headache, focal weakness or sensory changes.  Reports 

generalized weakness, no focal weakness


Endocrine:   Denies polyuria or polydipsia.  Hypoglycemia episodes.


Lymphatic:  Denies swollen glands. [] 


Psychiatric:  Denies depression or anxiety. []





Heart Score:


C/O Chest Pain:  No


Risk Factors:


Risk Factors:  DM, Current or recent (<one month) smoker, HTN, HLP, family 

history of CAD, obesity.


Risk Scores:


Score 0 - 3:  2.5% MACE over next 6 weeks - Discharge Home


Score 4 - 6:  20.3% MACE over next 6 weeks - Admit for Clinical Observation


Score 7 - 10:  72.7% MACE over next 6 weeks - Early Invasive Strategies





Allergies:


Allergies:





Allergies








Coded Allergies Type Severity Reaction Last Updated Verified


 


  Penicillins Allergy Intermediate Rash 21 Yes


 


  ampicillin Allergy Intermediate Rash 21 Yes


 


  celecoxib Allergy Intermediate Swelling 21 Yes











Physical Exam:


PE:





Constitutional: Well developed, well nourished, no acute distress, non-toxic 

appearance. []


HENT: Normocephalic, atraumatic, oropharynx is patent and clear, mucous 

membranes moist, external ears normal bilaterally, no rhinorrhea or epistaxis.


Eyes: PERRL, EOMI, conjunctiva normal, no discharge. [] 


Neck: Normal range of motion, no tenderness, supple, no stridor.  No midline 

tenderness or step-offs, no deformity.  Trachea is midline


Cardiovascular:Heart rate regular rhythm, was 2 radial and +2 posterior tibial 

pulses bilaterally


Lungs & Thorax:  Bilateral breath sounds clear to auscultation []


Abdomen: Abdomen is soft, obese, nondistended, nontender to palpation, normal 

bowel sounds, no palpable masses organomegaly


Skin: Warm, dry, no erythema, no rash. [] 


Back: No tenderness, no CVA tenderness. [] 


Extremities: No tenderness, no cyanosis, no clubbing, ROM intact, no edema.  

Pelvis is stable.  No limb deformities.  No calf tenderness.


Neurologic: She is awake, alert, oriented x3, cranial nerves II through XII 

grossly intact, 5 out of 5 motor strength all 4 extremities, no limb ataxia, 

sensation is grossly intact, speech is clear and fluent.  Gait is slow but 

steady.


Psychologic: Affect normal, judgement normal, mood normal.  She is very pleasant

 cooperative.





EKG:


EKG:


EKG is interpreted at 0911


Rhythms is sinus


Rate is 71 bpm


marked artifact


No STEMI





Radiology/Procedures:


Radiology/Procedures:


                                 IMAGING REPORT





                                     Signed





PATIENT: AKIL KENNEDY ACCOUNT: [id number]     MRN#: [id number]


: 1932           LOCATION: ER              AGE: 89


SEX: F                    EXAM DT: 22         ACCESSION#: [id number]


STATUS: REG ER            ORD. PHYSICIAN: LAZ VALENZUELA DO


REASON: falls, weakness


PROCEDURE: PORTABLE CHEST 1V








AP portable chest  radiograph 2022





Clinical History: Weakness, falls.





An AP erect portable digital radiograph of the chest was obtained.





The cardiac silhouette is mildly enlarged. The thoracic aorta is mildly 

tortuous. Atherosclerotic calcification of the thoracic aorta is seen. No acute 

pulmonary infiltrate is noted. No pneumothorax or pleural effusion is seen. 

Degenerative changes are seen involving the thoracic spine and both shoulders.





IMPRESSION: No acute abnormality is seen.





Electronically signed by: Adonis Dobbins MD (2022 9:22 AM) [id number]














DICTATED and SIGNED BY:     ADONIS DOBBINS MD


DATE:     22 [id number] 0








                                 IMAGING REPORT





                                     Signed





PATIENT: AKIL KENNEDY ACCOUNT: [id number]     MRN#: [id number]


: 1932           LOCATION: ER              AGE: 89


SEX: F                    EXAM DT: 22         ACCESSION#: [id number]


STATUS: REG ER            ORD. PHYSICIAN: LAZ VALENZUELA DO


REASON: falls, AMS


PROCEDURE: CT HEAD WO CONTRAST











CT HEAD





INDICATION: Fall, altered mental status





COMPARISON: None Available.





Exposure: One or more of the following individualized dose reduction techniques 

were utilized for this examination:  1. Automated exposure control  2. 

Adjustment of the mA and/or kV according to patient size  3. Use of iterative 

reconstruction technique





TECHNIQUE: 5 mm contiguous axial images were obtained from the skull base to the

 vertex in both bone and soft tissue algorithm.  





FINDINGS: 


No abnormal attenuation within the brain parenchyma. Mild contusion left 

parietal scalp region.





No evidence of acute intracranial hemorrhage.   No extra-axial fluid 

collections.





No mass effect or midline shift. Ventricular size is appropriate.  Basal 

cisterns are patent.





No fractures identified.Gray-white differentiation is preserved.Globes and 

orbits are within normal limits.   Paranasal sinuses and mastoid air cells are 

clear.   





IMPRESSION:


1.  No acute intracranial findings.


2.  Mild contusion left parietal scalp region.





Electronically signed by: Mino Rodriguez MD (2022 12:04 PM) [id number]














DICTATED and SIGNED BY:     MINO RODRIGUEZ MD


DATE:     22 [id number] 0





Course & Med Decision Making:


Course & Med Decision Making


Pertinent Labs and Imaging studies reviewed. (See chart for details)





The patient is given oral glucose solution and she is fed.  Her blood sugar has 

remained stable.  She is mentating well.  She was able to get up a couple of 

times to use the restroom, but she reports that she feels subjectively weak.  No

 objective evidence of weakness.  She reports that she does feel 

lightheaded/dizzy when she gets up/stands up but when she is lying flat, she has

 no symptoms.  She does not feel comfortable returning home.  I have discussed 

this with Dr. Haas, who is on-call for the patient's primary care doctor, Dr. Vail.  He agrees with the plan for admission, she will be admitted under Dr. Vail.





Dragon Disclaimer:


Dragon Disclaimer:


This electronic medical record was generated, in whole or in part, using a voice

 recognition dictation system.





Departure


Departure


Impression:  


   Primary Impression:  


   Hypoglycemia


   Additional Impressions:  


   Multiple falls


   Dizziness


Disposition:   ADMITTED AS INPATIENT


Admitting Physician:  Michael Vail


Condition:  STABLE


Referrals:  


MICHAEL VAIL MD (PCP)











LAZ VALENZUELA DO              2022 08:17

## 2022-01-09 NOTE — EKG
[facility]

              [address] [place] [address]

Test Date:    2022               Test Time:    09:10:40

Pat Name:     AKIL KENNEDY           Department:   

Patient ID:   [id number]           Room:          

Gender:       F                        Technician:   

:          1932               Requested By: LAZ VALENZUELA

Order Number: [id number]           Reading MD:     

                                 Measurements

Intervals                              Axis          

Rate:         72                       P:            74

MS:           160                      QRS:          69

QRSD:         94                       T:            59

QT:           396                                    

QTc:          435                                    

                           Interpretive Statements

SINUS RHYTHM

LEFT ATRIAL ABNORMALITY

ABNORMAL ECG

RI6.02

No previous ECG available for comparison

## 2022-01-09 NOTE — RAD
AP portable chest  radiograph 1/9/2022



Clinical History: Weakness, falls.



An AP erect portable digital radiograph of the chest was obtained.



The cardiac silhouette is mildly enlarged. The thoracic aorta is mildly tortuous. Atherosclerotic vianca
cification of the thoracic aorta is seen. No acute pulmonary infiltrate is noted. No pneumothorax or 
pleural effusion is seen. Degenerative changes are seen involving the thoracic spine and both shoulde
rs.



IMPRESSION: No acute abnormality is seen.



Electronically signed by: Adonis Dobbins MD (1/9/2022 9:22 AM) [id number]

## 2022-01-10 VITALS — SYSTOLIC BLOOD PRESSURE: 152 MMHG | DIASTOLIC BLOOD PRESSURE: 52 MMHG

## 2022-01-10 VITALS
SYSTOLIC BLOOD PRESSURE: 149 MMHG | SYSTOLIC BLOOD PRESSURE: 149 MMHG | DIASTOLIC BLOOD PRESSURE: 50 MMHG | DIASTOLIC BLOOD PRESSURE: 50 MMHG

## 2022-01-10 VITALS — SYSTOLIC BLOOD PRESSURE: 144 MMHG | DIASTOLIC BLOOD PRESSURE: 51 MMHG

## 2022-01-10 RX ADMIN — ATORVASTATIN CALCIUM SCH MG: 20 TABLET, FILM COATED ORAL at 09:05

## 2022-01-10 RX ADMIN — LISINOPRIL SCH MG: 20 TABLET ORAL at 09:04

## 2022-01-10 RX ADMIN — ATORVASTATIN CALCIUM SCH MG: 20 TABLET, FILM COATED ORAL at 09:04

## 2022-01-10 RX ADMIN — LISINOPRIL SCH MG: 20 TABLET ORAL at 09:05

## 2022-01-10 NOTE — PDOC
Provider Note


Date of Service:


DATE: 1/10/22 


TIME: 09:43


Provider Note


H&P dictated #[id number]





Justifications for Admission


Other Justification














MICHAEL HENLEY MD              Bob 10, 2022 09:43

## 2022-01-10 NOTE — HP
DATE OF SERVICE: 01/10/2022

ADMIT DATE: 01/09/2022

COMBINED HISTORY AND PHYSICAL AND DISCHARGE SUMMARY



HISTORY OF PRESENT ILLNESS:  This 89 years old female woke up yesterday morning 

and she was noted to have profound weakness and change in mental status.  She 

says that she just could not get up and go to the bathroom.  She had eaten a big

meal the day before and has her sugars under control as per the patient.  She 

did not have any hypoglycemia attacks recently.  She has been eating well.  She 

did not have any nausea, vomiting, diarrhea, chest pains, cold, cough, 

congestion, palpitations.  EMS checked her blood sugar and it was 60 in the 

emergency room, blood sugar was 30.  She was treated for hypoglycemia and 

because of the change in mental status and weakness, the patient did not want to

go home.  She has history of diabetes, hypertension, hyperlipidemia and is on 

glipizide and metformin.  CT scan of head did not show any acute intracranial 

findings.  There was mild contusion of the left parietal scalp region.



The patient denies any fall.



SYSTEMS REVIEW:  The patient denies any dizziness, palpitations, chest pains, 

weakness, abdominal pain, nausea, vomiting, diarrhea, fever, chills, dysuria or 

any other symptoms at this time.  She states that she is feeling better.



PAST MEDICAL HISTORY:  The patient has a history of hypertension, 

hyperlipidemia, diabetes mellitus type 2, anemia, hypothyroidism, asthma, 

depression, cirrhosis of liver, right ventricular mass, diabetes mellitus type 

2.



PAST SURGICAL HISTORY:  Includes hysterectomy, right knee injury, left breast 

mass removal, appendectomy, tubal pregnancy.



FAMILY HISTORY:  Positive for cancer of colon, two sisters and two brothers.  

Father had breathing problems and coronary artery disease and mother had 

diabetes.  Sister had coronary artery disease.



SOCIAL HISTORY:  No history of smoking, alcoholism, or drug abuse.



ALLERGIES:  THE PATIENT IS ALLERGIC TO PENICILLIN, AMOXICILLIN, AND CELECOXIB.



MEDICATIONS:  Includes lisinopril, atorvastatin, glipizide, metformin.



PHYSICAL EXAMINATION:

GENERAL:  The patient is an elderly female who is alert, oriented x 3, and not 

in acute distress.

VITAL SIGNS:  Temperature 97.6, pulse 67 per minute, respirations 18 per minute,

blood pressure 184/64, currently blood pressure is 152/52.  The patient is 

alert, oriented x 3, and not in acute distress.

EYES:  Pupils reacting to light.  Conjunctivae pale.  Sclerae muddy.

HEENT:  Unremarkable.

NECK:  Supple.  JVP normal.  No thyromegaly.  Trachea midline.

LUNGS:  Clear.

CARDIOVASCULAR:  S1, S2, regular.

ABDOMEN:  Soft, nontender.  No guarding, no rigidity.  Bowel sounds present.

EXTREMITIES:  No edema, no cyanosis, no calf tenderness.

CENTRAL NERVOUS SYSTEM:  Alert and oriented, moves all extremities.  Gait not 

tested.  The patient is back to her baseline mental status.



LABORATORY FINDINGS:  CT scan of head was negative for any acute changes.  Chest

x-ray did not show any acute abnormalities.  Blood sugars were 27, 39, 80.  Labs

showed blood sugar of 167.  .  Sodium 139, potassium 4.2, BUN 12, 

creatinine 0.8.  Troponin 24, albumin 3.4.  Glucose this morning is 93.  Lactic 

acid level was 2.7 and 1.1.  Urinalysis is negative.



FINAL DIAGNOSES:

1.  Acute hypoglycemia, corrected.

2.  Hypertension.

3.  Diabetes mellitus type 2.

4.  Hyperlipidemia.

5.  Acute metabolic encephalopathy and a fall due to hypoglycemia, resolved.

6.  Anemia.



PLAN:  Hypoglycemia is corrected, we will discontinue glipizide and metformin.  

I have advised the patient to check the blood sugar 3 times a day and eat 

properly and drink plenty of fluids.  I will see her in the office on Wednesday 

within 2 days and decide if we can replace her glipizide with another medication

such as Jardiance.  We will follow up A1c and other labs in the office.  

Clinically, she is doing well.  She does not want any home health services.  I 

will order physical therapy and occupational therapy to evaluate and treat her. 

If she remains stable, she can be discharged later home today.







PBP/SUN

DR: Julio   DD: 01/10/2022 09:43

DT: 01/10/2022 10:30   TID: [id number]

## 2022-01-10 NOTE — DISCH
DISCHARGE INSTRUCTIONS


Condition on Discharge


Condition on Discharge:  Stable





Activity After Discharge


Activity Instructions for Disc:  Activity as tolerated





Diet after Discharge


Diet after Discharge:  Regular


Additional Diet Restrictions:  diet as pre op





Checks after Discharge


Checks after discharge:  Check blood press - daily, Check blood sugar, ac/hs





Contacting the DRRowan after DC


Call your doctor for:  Concerns you may have





Follow-Up


Follow up with:  Dr.Pratip Henley in 2 days.











MICHAEL HENLEY MD              Bob 10, 2022 09:46

## 2022-01-10 NOTE — NUR
Pt provided with discharge instructions and taken by wheelchair to main entrance by RN and 
assisted into family members vehicle

## 2022-01-10 NOTE — NUR
SW following. Discussed with RN, pt from home with family, room air, ada diet. Discharge 
order for home with self care. RN advised no SW needs at this time.

## 2022-03-22 ENCOUNTER — HOSPITAL ENCOUNTER (OUTPATIENT)
Dept: HOSPITAL 61 - KCIC MAMMO | Age: 87
End: 2022-03-22
Attending: INTERNAL MEDICINE
Payer: MEDICARE

## 2022-03-22 DIAGNOSIS — Z12.31: Primary | ICD-10-CM

## 2022-03-22 PROCEDURE — 77067 SCR MAMMO BI INCL CAD: CPT

## 2022-03-22 PROCEDURE — 77063 BREAST TOMOSYNTHESIS BI: CPT

## 2022-03-22 NOTE — [facility]
Bilateral digital screening mammograms with 3-D tomosynthesis:



Reason for examination: Routine screening.



Comparison is made to previous studies dated 11/24/2020, 9/25/2020 and 1/8/2015.



Bilateral mammograms in CC and oblique projections were obtained with 2-D imaging and 3-D tomosynthes
is imaging on a Siemens Inspiration unit and reviewed on the workstation. Interpretation was made mickey fleming the benefit of CAD.



The skin and nipples show no abnormalities. No abnormal axillary lymph nodes are seen. The breast par
enchyma shows scattered fatty and fibroglandular density. (Breast density: Category B.) There are pos
top changes posterior laterally in the left breast. There continues be a small nodular density at the
 9:00 position of the right breast with adjacent biopsy clip which is stable. There are no new domina
nt masses, suspicious calcifications or architectural distortion. Benign calcifications are present.



Impression:



No evidence of malignancy. Recommend routine screening.



BI-RAD Category 2: Benign.



"Our facility is accredited by the [employer] Mammography Program."



This patient's information has been entered into a reminder system for the patient to be notified mickey fleming the results of her examination and a target date for the next mammogram.



Electronically signed by: Clarisa Kidd MD (3/22/2022 4:16 PM) [id number]